# Patient Record
Sex: FEMALE | Race: WHITE | NOT HISPANIC OR LATINO | Employment: OTHER | URBAN - METROPOLITAN AREA
[De-identification: names, ages, dates, MRNs, and addresses within clinical notes are randomized per-mention and may not be internally consistent; named-entity substitution may affect disease eponyms.]

---

## 2017-01-25 ENCOUNTER — TELEPHONE (OUTPATIENT)
Dept: ENDOSCOPY | Facility: HOSPITAL | Age: 67
End: 2017-01-25

## 2017-01-25 NOTE — TELEPHONE ENCOUNTER
Attempted to contact patient in order to schedule ordered Endoscopy procedure.  [id number] call back number provided.

## 2017-01-31 DIAGNOSIS — E11.9 TYPE 2 DIABETES MELLITUS WITHOUT COMPLICATION: ICD-10-CM

## 2017-02-20 PROBLEM — M12.819 ROTATOR CUFF ARTHROPATHY: Status: ACTIVE | Noted: 2017-02-20

## 2017-07-28 PROBLEM — M54.16 LUMBAR RADICULOPATHY: Status: ACTIVE | Noted: 2017-07-28

## 2017-08-08 ENCOUNTER — TELEPHONE (OUTPATIENT)
Dept: PAIN MEDICINE | Facility: CLINIC | Age: 67
End: 2017-08-08

## 2017-08-08 NOTE — TELEPHONE ENCOUNTER
Received a referral from [facility] to consult with Dr. Madsen for L1-L3 facet block and coccyx injection with a Dx: Lumbar Spinal stenosis & Lumbar radiculopathy with right sided sciatica.    Left message for pt to contact the office

## 2017-08-09 NOTE — TELEPHONE ENCOUNTER
Spoke to patient, scheduled appointment with Dr. Madsen at the [place] location on Monday 8/14/17 for 2pm.  Patient agreed to this appointment date and time.

## 2017-08-09 NOTE — TELEPHONE ENCOUNTER
Pt was given a background of what Dr. Madsen offers. Pt agree to see Dr. Madsen to discuss injections. The next available appt in the [facility] is September 8th. Pt stated she would like a sooner appt in the [place] office.    Please contact the pt with the next available appt

## 2017-08-14 ENCOUNTER — OFFICE VISIT (OUTPATIENT)
Dept: PAIN MEDICINE | Facility: CLINIC | Age: 67
End: 2017-08-14
Payer: COMMERCIAL

## 2017-08-14 VITALS
DIASTOLIC BLOOD PRESSURE: 62 MMHG | WEIGHT: 152 LBS | SYSTOLIC BLOOD PRESSURE: 110 MMHG | HEART RATE: 70 BPM | BODY MASS INDEX: 35.33 KG/M2

## 2017-08-14 DIAGNOSIS — G89.4 CHRONIC PAIN SYNDROME: Primary | ICD-10-CM

## 2017-08-14 DIAGNOSIS — M54.16 LUMBAR RADICULOPATHY: ICD-10-CM

## 2017-08-14 DIAGNOSIS — M96.1 LUMBAR POST-LAMINECTOMY SYNDROME: ICD-10-CM

## 2017-08-14 DIAGNOSIS — M53.3 COCCYDYNIA: ICD-10-CM

## 2017-08-14 PROCEDURE — 1125F AMNT PAIN NOTED PAIN PRSNT: CPT | Mod: S$GLB,,, | Performed by: ANESTHESIOLOGY

## 2017-08-14 PROCEDURE — 99204 OFFICE O/P NEW MOD 45 MIN: CPT | Mod: S$GLB,,, | Performed by: ANESTHESIOLOGY

## 2017-08-14 PROCEDURE — 99999 PR PBB SHADOW E&M-EST. PATIENT-LVL IV: CPT | Mod: PBBFAC,,, | Performed by: ANESTHESIOLOGY

## 2017-08-14 PROCEDURE — 3008F BODY MASS INDEX DOCD: CPT | Mod: S$GLB,,, | Performed by: ANESTHESIOLOGY

## 2017-08-14 PROCEDURE — 1159F MED LIST DOCD IN RCRD: CPT | Mod: S$GLB,,, | Performed by: ANESTHESIOLOGY

## 2017-08-14 NOTE — PROGRESS NOTES
Chronic Pain - New Consult    Referring Physician: Cesar Vanessa MD    Chief Complaint:   Chief Complaint   Patient presents with    Low-back Pain     referred by DR. Cesar Vanessa    Leg Pain     right        SUBJECTIVE:    Margarita Ventura presents to the clinic for the evaluation of back pain. The pain started 2 weeks ago following CT mylegram dye and symptoms have been worsening.The pain is located in the coccyx area and radiates to the right lower extremity to toes.  The pain is described as aching and is rated as 10/10. The pain is rated with a score of  9/10 on the BEST day and a score of 10/10 on the WORST day.  Symptoms interfere with daily activity, sleeping and work. The pain is exacerbated by Sitting, Standing, Touching, Coughing/Sneezing, Walking and Getting out of bed/chair.  The pain is mitigated by medications. She reports spending 0 hours per day reclining. The patient reports 7-8 hours of uninterrupted sleep per night.    She has hx of 2 L spine surgies, L3-5 diskectomy and fusion and L5-S1 fusion many years ago    Patient denies night fever/night sweats, urinary incontinence, bowel incontinence, significant weight loss, significant motor weakness and loss of sensations.    Physical Therapy/Home Exercise: no      Pain Disability Index Review:  Last 3 PDI Scores 8/14/2017   Pain Disability Index (PDI) 66       Pain Medications:    - Opioids: Percocet  - Adjuvant Medications: Lyrica ( Pregabalin), Tizanidine  - Anti-Coagulants: Aspirin  - Others: see medication list     report:  Reviewed and consistent with medication use as prescribed.    Pain Procedures: two former back surgeries 1997, 2000    Imaging: CT Lumbar Spine Without Contrast  Narrative     Study: Lumbar CT myelogram    Indication: Lumbar radiculopathy    Technique: Axial CT images were obtained following lumbar myelogram.  Coronal and sagittal reformatted images obtained. Iterative reconstruction technique was used.       Comparison:  Lumbar CT myelogram of 06/13/2014.    Findings:   At L1-2, a broad-based dorsal disc bulge with minimal effacement of thecal sac.  No spinal stenosis or foraminal narrowing.  This is a new finding from the prior exam.    At L2-3, broad-based dorsal disc bulge with a superimposed left central disc extrusion which extends cranially for a short distance along the posterior margin of L2 vertebral body.  This results in mild compression of thecal sac with no spinal stenosis or foraminal narrowing.  The disc extrusion is a new finding compared to the prior study.    Bilateral pedicle screws at L3, L4, L5 and S1 with posterior interconnecting rods, as noted on prior examination.  There is associated streak artifact related to the hardware.  There is chronic lucency surrounding the left S1 pedicle screw.    At L3-4, there is discectomy with cage device in place.  Decompressive laminectomy.  There is no spinal stenosis or foraminal narrowing.    At L4-5, there is discectomy with cage device in place.  Decompressive laminectomy. There is no spinal stenosis or foraminal narrowing.    At L5-S1, there is a decompressive laminectomy.  No spinal stenosis or foraminal narrowing.    Spinal stimulator device is noted.   Impression       1.  At L2-3, a broad-based dorsal disc bulge with a superimposed left central disc extrusion which extends cranially for short distance along the posterior margin of L2 and results in mild compression of thecal sac with no spinal stenosis or foraminal narrowing.  The disc extrusion is a new finding when compared to the prior lumbar CT myelogram of 06/13/2014.  2.  At L1-2, a broad-based dorsal disc bulge with minimal thecal sac effacement.  This is a new finding from prior exam.  3. Discectomy with posterior fusion L3-L5 with decompressive laminectomy and posterior fusion L5-S1.      Electronically signed by: DEJON NEWSOME MD  Date: 07/28/17  Time: 15:19            Past Medical History:   Diagnosis  Date    Acute renal failure (ARF)     PAST H/O    Anxiety and depression     Arthritis     RA    Bronchitis     COPD (chronic obstructive pulmonary disease)     Diabetes mellitus     IDDM    Diverticulosis     Diverticulosis of colon 2006    Encounter for blood transfusion     Fibromyalgia     History of stroke -- cerebellar CVA 05/2014    Believed secondary to temporal arteritis    Hypertension     Klippel-Feil syndrome     Left anterior fascicular block     Peroneal tenosynovitis 10/2015    Pneumonia     Seizures     Shingles 2005    Stroke     Suicide attempt 08/15/2016    Use of BDZ, TCA, barbiturates    Tear of talofibular ligament of right lower extremity 10/2015    Temporal arteritis     Temporal arteritis syndrome     Thyroid disease      Past Surgical History:   Procedure Laterality Date    CARPAL TUNNEL RELEASE Bilateral     CHOLECYSTECTOMY  1985    COLONOSCOPY  2013    ESOPHAGOGASTRODUODENOSCOPY  09/17/2015    Biopsies performed    HYSTERECTOMY  1979    LUMBAR SPINE SURGERY      Diskectomy at L3-4 and L4-5 with laminectomies    TONSILLECTOMY       Social History     Social History    Marital status: Single     Spouse name: N/A    Number of children: N/A    Years of education: N/A     Occupational History    Not on file.     Social History Main Topics    Smoking status: Current Every Day Smoker     Packs/day: 0.25     Years: 40.00     Types: Cigarettes    Smokeless tobacco: Not on file    Alcohol use No    Drug use: No    Sexual activity: Not on file     Other Topics Concern    Not on file     Social History Narrative    No narrative on file     Family History   Problem Relation Age of Onset    Diabetes Mother     Heart disease Mother     Heart disease Father        Review of patient's allergies indicates:  No Known Allergies    Current Outpatient Prescriptions   Medication Sig    acetaminophen (TYLENOL) 500 MG tablet Take 500 mg by mouth every 6 (six) hours as  "needed for Pain.    albuterol (ACCUNEB) 1.25 mg/3 mL Nebu Take 1.25 mg by nebulization 2 (two) times daily.     albuterol (PROVENTIL) 2.5 mg /3 mL (0.083 %) nebulizer solution Take 2.5 mg by nebulization every 6 (six) hours as needed for Wheezing. Rescue    albuterol (VENTOLIN HFA) 90 mcg/actuation inhaler Inhale 2 puffs into the lungs 2 (two) times daily. Rescue    alprazolam (XANAX) 1 MG tablet TK 1 T PO TID    amitriptyline (ELAVIL) 75 MG tablet Take 75 mg by mouth every evening.    aspirin 81 MG Chew Take 81 mg by mouth once daily.    BD INSULIN PEN NEEDLE UF SHORT 31 gauge x 5/16" Ndle USE AS DIRECTED QID    escitalopram oxalate (LEXAPRO) 20 MG tablet TK 1 T PO AM    fluticasone (FLOVENT HFA) 44 mcg/actuation inhaler Inhale 2 puffs into the lungs 2 (two) times daily.     furosemide (LASIX) 40 MG tablet Take 40 mg by mouth every morning.     HUMALOG KWIKPEN 100 unit/mL InPn pen INJECT SC 5 UNITS BEFORE EACH MEAL PENDING MEAL SIZE AS DIRECTED    insulin syringe-needle U-100 1 mL 30 gauge X 7/16" Syrg USE TO TEST QD    LEVEMIR 100 unit/mL injection INJECT SC 50 UNITS QAM THEN 50 UNITS QPM    levetiracetam (KEPPRA) 500 MG Tab TK 1 T PO  BID    levothyroxine (SYNTHROID) 75 MCG tablet TK 1 T PO AM    LYRICA 100 mg capsule TK 1 C PO TID    mirtazapine (REMERON) 30 MG tablet Take 30 mg by mouth every evening.    mycophenolate (CELLCEPT) 250 mg Cap Q AM    NITROSTAT 0.4 mg SL tablet ALICIA    oxycodone-acetaminophen (PERCOCET)  mg per tablet TK 1 T PO TID, PRN PAIN    pregabalin (LYRICA) 75 MG capsule Take 100 mg by mouth 3 (three) times daily.     PROAIR HFA 90 mcg/actuation inhaler INHALE 2 PUFFS PO BID    rosuvastatin (CRESTOR) 20 MG tablet Take 1 tablet (20 mg total) by mouth once daily. (Patient taking differently: Take 20 mg by mouth nightly. )    SYRINGE AND NEEDLE,INSULIN,1ML (INSULIN SYRINGE-NEEDLE U-100) 1 mL 29 gauge x 7/16" Syrg USE AS DIRECTED BID    tizanidine (ZANAFLEX) 4 MG " tablet Take 4 mg by mouth nightly.    TRADJENTA 5 mg Tab tablet TK 1 T PO AM     No current facility-administered medications for this visit.        REVIEW OF SYSTEMS:    GENERAL:  No weight loss, malaise or fevers.+ DM   HEENT:  Negative for frequent or significant headaches.   NECK:+ neck pain   RESPIRATORY: + COPD,  Negative for cough, wheezing or shortness of breath.  CARDIOVASCULAR:  Negative for chest pain, leg swelling or palpitations.  GI:  Negative for abdominal discomfort, blood in stools or black stools or change in bowel habits.  MUSCULOSKELETAL:  See HPI.  SKIN:  Negative for lesions, rash, and itching.  PSYCH:  Negative for sleep disturbance, + depression + anxiety   HEMATOLOGY/LYMPHOLOGY:  Negative for prolonged bleeding, bruising easily or swollen nodes.  NEURO: + temporal arteritis  No history of  syncope, paralysis, seizures  Hx of CVA, + seizures on Keppra   All other reviewed and negative other than HPI.    OBJECTIVE:    /62   Pulse 70   Wt 68.9 kg (152 lb)   BMI 35.33 kg/m²     PHYSICAL EXAMINATION:    General appearance: Well appearing, in no acute distress, alert and oriented x3.  Psych:  Mood and affect appropriate.  Skin:scar of previous L spine surgery, + rash in the buttock/coccygela area (burn due to heating pad)  Skin color, texture, turgor normal, no rashes or lesions, in both upper and lower body.  Head/face:  Normocephalic, atraumatic. No palpable lymph nodes.  Neck: No pain to palpation over the cervical paraspinous muscles. Spurling Negative. No pain with neck flexion, extension, or lateral flexion.   Cor: RRR  Pulm: CTA  Back:+ rash in the buttock and coccygeal area ( pt slept over heating pad and has had that rash for, burn over the area, dressing on top)  Straight leg raising in the sitting and supine positions is negative to radicular pain. +  pain to palpation over the L spine   Extremities: Peripheral joint ROM is full and pain free without obvious instability or  laxity in all four extremities. No deformities, edema, or skin discoloration. Good capillary refill.  Musculoskeletal: Shoulder, hip, sacroiliac and knee provocative maneuvers are negative. Bilateral upper and lower extremity strength is normal and symmetric.  No atrophy or tone abnormalities are noted.  Neuro: Bilateral upper and lower extremity coordination and muscle stretch reflexes are physiologic and symmetric.  Plantar response are downgoing. No loss of sensation is noted.  Gait: slow    ASSESSMENT: 66 y.o. year old female with coccygeal pain and right leg radicular   pain, consistent with        1. Chronic pain syndrome    2. Lumbar post-laminectomy syndrome    3. Lumbar radiculopathy    4. Coccydynia      She has hx of 2 L spine surgies, L3-5 diskectomy and fusion and L5-S1 fusion many years ago  She is now on percocet 4 times day. He has a burn area around the coccygeal /buttocks after using a heating pad.   PLAN:     - I have stressed the importance of physical activity and a home exercise plan to help with pain and improve health.  -I will consider caudal OSWALDO with catheter , however I will await till the rash/bruned area is healed. She will come back in 2 weeks and she can continue topical treatment of the area. She also need to have better glycemic control prior to injection  -She might be an SCS candidate, however proper glycemic control should be achieved first.   - RTC in 2 weeks  - Counseled patient regarding the importance of activity modification, constant sleeping habits and physical therapy.    The above plan and management options were discussed at length with patient. Patient is in agreement with the above and verbalized understanding. It will be communicated with the referring physician via electronic record, fax, or mail.    Cecilia Madsen  08/14/2017

## 2017-08-14 NOTE — LETTER
August 14, 2017      Cesar Vanessa MD  [facility]  [facility] [address]           [facility]  [address] [place] [address] [address]  [facility] [address]  Phone: [phone]          Patient: Margarita Ventura   MR Number: [id number]   YOB: 1950   Date of Visit: 8/14/2017       Dear Dr. Cesar Vanessa:    Thank you for referring Margarita Ventura to me for evaluation. Attached you will find relevant portions of my assessment and plan of care.    If you have questions, please do not hesitate to call me. I look forward to following Margarita Ventura along with you.    Sincerely,    Cecilia Madsen MD    Enclosure  CC:  No Recipients    If you would like to receive this communication electronically, please contact [email or web address] or [phone] to request more information on [facility] access.    For providers and/or their staff who would like to refer a patient to [facility], please contact us through our one-stop-shop provider referral line, [facility] , at [phone].    If you feel you have received this communication in error or would no longer like to receive these types of communications, please e-mail [email or web address]

## 2017-09-06 NOTE — PROGRESS NOTES
Chronic patient Established Note (Follow up visit)      SUBJECTIVE:    Margarita Ventura presents to the clinic for a follow-up appointment for back pain. She has hx of 2 L spine surgies, L3-5 diskectomy and fusion and L5-S1 fusion many years ago. She was rescheduled for a Caudal OSWALDO with Dr. Madsen due to rash/bruned area not being healed, she has since seen Dr. Orellana her PC for clearance and has been cleared. I will have my MA scan prescription clearance  into media per Dr. Orellana and s/f a Caudal OSWALDO per Dr. Madsen. I will s/f Caudal OSWALDO  with Cath       Patient denies night fever/night sweats, urinary incontinence, bowel incontinence, significant weight loss, significant motor weakness and loss of sensations.  Since the last visit, Margarita Ventura states the pain has been persistant. Current pain intensity is 6/10.    Pain Disability Index Review:  Last 3 PDI Scores 9/8/2017 8/14/2017   Pain Disability Index (PDI) 47 66       Pain Medications:     - Opioids: Percocet  - Adjuvant Medications: Lyrica ( Pregabalin), Tizanidine  - Anti-Coagulants: Aspirin  - Others: see medication list    Opioid Contract: no     report:  Reviewed and consistent with medication use as prescribed.    Pain Procedures:  two former back surgeries 1997, 2000    Physical Therapy/Home Exercise: no    Imaging:  CT Lumbar Spine Without Contrast  Narrative      Study: Lumbar CT myelogram    Indication: Lumbar radiculopathy    Technique: Axial CT images were obtained following lumbar myelogram.  Coronal and sagittal reformatted images obtained. Iterative reconstruction technique was used.       Comparison: Lumbar CT myelogram of 06/13/2014.    Findings:   At L1-2, a broad-based dorsal disc bulge with minimal effacement of thecal sac.  No spinal stenosis or foraminal narrowing.  This is a new finding from the prior exam.    At L2-3, broad-based dorsal disc bulge with a superimposed left central disc extrusion which extends cranially for a short  distance along the posterior margin of L2 vertebral body.  This results in mild compression of thecal sac with no spinal stenosis or foraminal narrowing.  The disc extrusion is a new finding compared to the prior study.    Bilateral pedicle screws at L3, L4, L5 and S1 with posterior interconnecting rods, as noted on prior examination.  There is associated streak artifact related to the hardware.  There is chronic lucency surrounding the left S1 pedicle screw.    At L3-4, there is discectomy with cage device in place.  Decompressive laminectomy.  There is no spinal stenosis or foraminal narrowing.    At L4-5, there is discectomy with cage device in place.  Decompressive laminectomy. There is no spinal stenosis or foraminal narrowing.    At L5-S1, there is a decompressive laminectomy.  No spinal stenosis or foraminal narrowing.    Spinal stimulator device is noted.   Impression        1.  At L2-3, a broad-based dorsal disc bulge with a superimposed left central disc extrusion which extends cranially for short distance along the posterior margin of L2 and results in mild compression of thecal sac with no spinal stenosis or foraminal narrowing.  The disc extrusion is a new finding when compared to the prior lumbar CT myelogram of 06/13/2014.  2.  At L1-2, a broad-based dorsal disc bulge with minimal thecal sac effacement.  This is a new finding from prior exam.  3. Discectomy with posterior fusion L3-L5 with decompressive laminectomy and posterior fusion L5-S1.      Electronically signed by: DEJON NEWSOME MD  Date: 07/28/17  Time: 15:19          Allergies: Review of patient's allergies indicates:  No Known Allergies    Current Medications:   Current Outpatient Prescriptions   Medication Sig Dispense Refill    acetaminophen (TYLENOL) 500 MG tablet Take 500 mg by mouth every 6 (six) hours as needed for Pain.      albuterol (ACCUNEB) 1.25 mg/3 mL Nebu Take 1.25 mg by nebulization 2 (two) times daily.       albuterol  "(PROVENTIL) 2.5 mg /3 mL (0.083 %) nebulizer solution Take 2.5 mg by nebulization every 6 (six) hours as needed for Wheezing. Rescue      albuterol (VENTOLIN HFA) 90 mcg/actuation inhaler Inhale 2 puffs into the lungs 2 (two) times daily. Rescue      alprazolam (XANAX) 1 MG tablet TK 1 T PO TID  2    amitriptyline (ELAVIL) 75 MG tablet Take 75 mg by mouth every evening.      aspirin 81 MG Chew Take 81 mg by mouth once daily.      BD INSULIN PEN NEEDLE UF SHORT 31 gauge x 5/16" Ndle USE AS DIRECTED QID  5    escitalopram oxalate (LEXAPRO) 20 MG tablet TK 1 T PO AM  5    fluticasone (FLOVENT HFA) 44 mcg/actuation inhaler Inhale 2 puffs into the lungs 2 (two) times daily.       furosemide (LASIX) 40 MG tablet Take 40 mg by mouth every morning.       HUMALOG KWIKPEN 100 unit/mL InPn pen INJECT SC 5 UNITS BEFORE EACH MEAL PENDING MEAL SIZE AS DIRECTED  1    insulin syringe-needle U-100 1 mL 30 gauge X 7/16" Syrg USE TO TEST QD  11    LEVEMIR 100 unit/mL injection INJECT SC 50 UNITS QAM THEN 50 UNITS QPM  5    levetiracetam (KEPPRA) 500 MG Tab TK 1 T PO  BID  2    levothyroxine (SYNTHROID) 75 MCG tablet TK 1 T PO AM  6    LYRICA 100 mg capsule TK 1 C PO TID  5    mirtazapine (REMERON) 30 MG tablet Take 30 mg by mouth every evening.      mycophenolate (CELLCEPT) 250 mg Cap Q AM  2    NITROSTAT 0.4 mg SL tablet ALICIA  12    oxycodone-acetaminophen (PERCOCET)  mg per tablet TK 1 T PO TID, PRN PAIN  0    pregabalin (LYRICA) 75 MG capsule Take 100 mg by mouth 3 (three) times daily.       PROAIR HFA 90 mcg/actuation inhaler INHALE 2 PUFFS PO BID  3    rosuvastatin (CRESTOR) 20 MG tablet Take 1 tablet (20 mg total) by mouth once daily. (Patient taking differently: Take 20 mg by mouth nightly. ) 90 tablet 3    SYRINGE AND NEEDLE,INSULIN,1ML (INSULIN SYRINGE-NEEDLE U-100) 1 mL 29 gauge x 7/16" Syrg USE AS DIRECTED BID  5    tizanidine (ZANAFLEX) 4 MG tablet Take 4 mg by mouth nightly.      TRADJENTA 5 " mg Tab tablet TK 1 T PO AM  6     No current facility-administered medications for this visit.        REVIEW OF SYSTEMS:    GENERAL:  No weight loss, malaise or fevers.+ DM   HEENT:  Negative for frequent or significant headaches.   NECK:+ neck pain   RESPIRATORY: + COPD,  Negative for cough, wheezing or shortness of breath.  CARDIOVASCULAR:  Negative for chest pain, leg swelling or palpitations.  GI:  Negative for abdominal discomfort, blood in stools or black stools or change in bowel habits.  MUSCULOSKELETAL:  See HPI.  SKIN:  Negative for lesions, rash, and itching.  PSYCH:  Negative for sleep disturbance, + depression + anxiety   HEMATOLOGY/LYMPHOLOGY:  Negative for prolonged bleeding, bruising easily or swollen nodes.  NEURO: + temporal arteritis  No history of  syncope, paralysis, seizures  Hx of CVA, + seizures on Keppra   All other reviewed and negative other than HPI.       Past Medical History:  Past Medical History:   Diagnosis Date    Acute renal failure (ARF)     PAST H/O    Anxiety and depression     Arthritis     RA    Bronchitis     COPD (chronic obstructive pulmonary disease)     Diabetes mellitus     IDDM    Diverticulosis     Diverticulosis of colon 2006    Encounter for blood transfusion     Fibromyalgia     History of stroke -- cerebellar CVA 05/2014    Believed secondary to temporal arteritis    Hypertension     Klippel-Feil syndrome     Left anterior fascicular block     Peroneal tenosynovitis 10/2015    Pneumonia     Seizures     Shingles 2005    Stroke     Suicide attempt 08/15/2016    Use of BDZ, TCA, barbiturates    Tear of talofibular ligament of right lower extremity 10/2015    Temporal arteritis     Temporal arteritis syndrome     Thyroid disease        Past Surgical History:  Past Surgical History:   Procedure Laterality Date    CARPAL TUNNEL RELEASE Bilateral     CHOLECYSTECTOMY  1985    COLONOSCOPY  2013    ESOPHAGOGASTRODUODENOSCOPY  09/17/2015     Biopsies performed    HYSTERECTOMY  1979    LUMBAR SPINE SURGERY      Diskectomy at L3-4 and L4-5 with laminectomies    TONSILLECTOMY         Family History:  Family History   Problem Relation Age of Onset    Diabetes Mother     Heart disease Mother     Heart disease Father        Social History:  Social History     Social History    Marital status: Single     Spouse name: N/A    Number of children: N/A    Years of education: N/A     Social History Main Topics    Smoking status: Current Every Day Smoker     Packs/day: 0.25     Years: 40.00     Types: Cigarettes    Smokeless tobacco: None    Alcohol use No    Drug use: No    Sexual activity: Not Asked     Other Topics Concern    None     Social History Narrative    None       OBJECTIVE:    /73   Pulse 81   Wt 69.1 kg (152 lb 5.4 oz)   BMI 35.41 kg/m²     PHYSICAL EXAMINATION:    General appearance: Well appearing, in no acute distress, alert and oriented x3.  Psych:  Mood and affect appropriate.  Skin:scar of previous L spine surgery, + rash in the buttock/coccygela area (burn due to heating pad)  Skin color, texture, turgor normal, no rashes or lesions, in both upper and lower body.  Head/face:  Normocephalic, atraumatic. No palpable lymph nodes.  Neck: No pain to palpation over the cervical paraspinous muscles. Spurling Negative. No pain with neck flexion, extension, or lateral flexion.   Cor: RRR  Pulm: CTA  Back:+ rash in the buttock and coccygeal area ( pt slept over heating pad and has had that rash for, burn over the area, dressing on top)  Straight leg raising in the sitting and supine positions is negative to radicular pain. +  pain to palpation over the L spine   Extremities: Peripheral joint ROM is full and pain free without obvious instability or laxity in all four extremities. No deformities, edema, or skin discoloration. Good capillary refill.  Musculoskeletal: Shoulder, hip, sacroiliac and knee provocative maneuvers are  negative. Bilateral upper and lower extremity strength is normal and symmetric.  No atrophy or tone abnormalities are noted.  Neuro: Bilateral upper and lower extremity coordination and muscle stretch reflexes are physiologic and symmetric.  Plantar response are downgoing. No loss of sensation is noted.  Gait: slow      ASSESSMENT: 66 y.o. year old female with with coccygeal pain and right leg radicular   pain, consistent with         Diagnosis:    1. Chronic pain syndrome     2. Lumbar post-laminectomy syndrome     3. Lumbar radiculopathy     4. Coccydynia          She has hx of 2 L spine surgies, L3-5 diskectomy and fusion and L5-S1 fusion many years ago  She is now on percocet 4 times day. He has a burn area around the coccygeal /buttocks after using a heating pad.     PLAN:     - I have stressed the importance of physical activity and a home exercise plan to help with pain and improve health.  - Patient can continue with medications for now since they are providing benefits, using them appropriately, and without side effects.   - Counseled patient regarding the importance of activity modification, constant sleeping habits and physical therapy.  -She has been cleared by her PCP to have injection, she had previous rash/burn that wasn't healed.  Pr  -I personally examined burn/ rash and it appears to be healed, she will have injection next Friday  - Schedule for a caudal OSWALDO with catheter  to help withpain and progress with a Home exercise program.  - I have explained the risks, benefits, and alternatives of the procedure in detail. The patient voices understanding and all questions have been answered. The patient agrees to proceed as planned. Written Consent obtained.   -RTC 2-3 weeks following procedure.  -The above plan and management options were discussed at length with patient. Patient is in agreement with the above and verbalized understanding. Dr. Madsen   was consulted on this patient  and agrees with this  plan.       MIRELLA Potter    09/08/2017

## 2017-09-08 ENCOUNTER — OFFICE VISIT (OUTPATIENT)
Dept: PAIN MEDICINE | Facility: CLINIC | Age: 67
End: 2017-09-08
Payer: COMMERCIAL

## 2017-09-08 VITALS
WEIGHT: 152.31 LBS | HEART RATE: 81 BPM | SYSTOLIC BLOOD PRESSURE: 129 MMHG | DIASTOLIC BLOOD PRESSURE: 73 MMHG | BODY MASS INDEX: 35.41 KG/M2

## 2017-09-08 DIAGNOSIS — M53.3 COCCYDYNIA: ICD-10-CM

## 2017-09-08 DIAGNOSIS — M54.16 LUMBAR RADICULOPATHY: ICD-10-CM

## 2017-09-08 DIAGNOSIS — M96.1 LUMBAR POST-LAMINECTOMY SYNDROME: ICD-10-CM

## 2017-09-08 DIAGNOSIS — G89.4 CHRONIC PAIN SYNDROME: Primary | ICD-10-CM

## 2017-09-08 PROCEDURE — 99213 OFFICE O/P EST LOW 20 MIN: CPT | Mod: S$GLB,,, | Performed by: NURSE PRACTITIONER

## 2017-09-08 PROCEDURE — 3008F BODY MASS INDEX DOCD: CPT | Mod: S$GLB,,, | Performed by: NURSE PRACTITIONER

## 2017-09-08 PROCEDURE — 99999 PR PBB SHADOW E&M-EST. PATIENT-LVL III: CPT | Mod: PBBFAC,,, | Performed by: NURSE PRACTITIONER

## 2017-09-08 PROCEDURE — 1125F AMNT PAIN NOTED PAIN PRSNT: CPT | Mod: S$GLB,,, | Performed by: NURSE PRACTITIONER

## 2017-09-08 PROCEDURE — 1159F MED LIST DOCD IN RCRD: CPT | Mod: S$GLB,,, | Performed by: NURSE PRACTITIONER

## 2017-09-13 RX ORDER — ASPIRIN 81 MG/1
81 TABLET ORAL DAILY
COMMUNITY
End: 2020-08-03

## 2017-09-15 ENCOUNTER — HOSPITAL ENCOUNTER (OUTPATIENT)
Facility: HOSPITAL | Age: 67
Discharge: HOME | End: 2017-09-15
Attending: ANESTHESIOLOGY | Admitting: ANESTHESIOLOGY
Payer: COMMERCIAL

## 2017-09-15 ENCOUNTER — HOSPITAL ENCOUNTER (OUTPATIENT)
Dept: RADIOLOGY | Facility: HOSPITAL | Age: 67
Discharge: HOME | End: 2017-09-15
Attending: NURSE PRACTITIONER
Payer: COMMERCIAL

## 2017-09-15 DIAGNOSIS — M54.16 LUMBAR RADICULOPATHY: ICD-10-CM

## 2017-09-15 PROCEDURE — 27200120 HC KIT IV START (RUSH ONLY): Performed by: ANESTHESIOLOGY

## 2017-09-15 PROCEDURE — 62327 NJX INTERLAMINAR LMBR/SAC: CPT | Mod: ,,, | Performed by: ANESTHESIOLOGY

## 2017-09-15 PROCEDURE — 25500020 PHARM REV CODE 255: Performed by: ANESTHESIOLOGY

## 2017-09-15 PROCEDURE — 25000003 PHARM REV CODE 250: Performed by: ANESTHESIOLOGY

## 2017-09-15 PROCEDURE — 63600175 PHARM REV CODE 636 W HCPCS: Performed by: ANESTHESIOLOGY

## 2017-09-15 PROCEDURE — 62323 NJX INTERLAMINAR LMBR/SAC: CPT | Performed by: ANESTHESIOLOGY

## 2017-09-15 PROCEDURE — 99152 MOD SED SAME PHYS/QHP 5/>YRS: CPT | Mod: ,,, | Performed by: ANESTHESIOLOGY

## 2017-09-15 RX ORDER — SODIUM CHLORIDE, SODIUM LACTATE, POTASSIUM CHLORIDE, CALCIUM CHLORIDE 600; 310; 30; 20 MG/100ML; MG/100ML; MG/100ML; MG/100ML
INJECTION, SOLUTION INTRAVENOUS CONTINUOUS
Status: DISCONTINUED | OUTPATIENT
Start: 2017-09-15 | End: 2017-09-15 | Stop reason: HOSPADM

## 2017-09-15 RX ORDER — MIDAZOLAM HYDROCHLORIDE 1 MG/ML
INJECTION INTRAMUSCULAR; INTRAVENOUS
Status: DISCONTINUED | OUTPATIENT
Start: 2017-09-15 | End: 2017-09-15 | Stop reason: HOSPADM

## 2017-09-15 RX ORDER — TRIAMCINOLONE ACETONIDE 40 MG/ML
INJECTION, SUSPENSION INTRA-ARTICULAR; INTRAMUSCULAR
Status: DISCONTINUED | OUTPATIENT
Start: 2017-09-15 | End: 2017-09-15 | Stop reason: HOSPADM

## 2017-09-15 RX ORDER — FENTANYL CITRATE 50 UG/ML
INJECTION, SOLUTION INTRAMUSCULAR; INTRAVENOUS
Status: DISCONTINUED | OUTPATIENT
Start: 2017-09-15 | End: 2017-09-15 | Stop reason: HOSPADM

## 2017-09-15 RX ORDER — LIDOCAINE HYDROCHLORIDE 5 MG/ML
INJECTION, SOLUTION INFILTRATION; PERINEURAL
Status: DISCONTINUED | OUTPATIENT
Start: 2017-09-15 | End: 2017-09-15 | Stop reason: HOSPADM

## 2017-09-15 RX ORDER — LIDOCAINE HYDROCHLORIDE 10 MG/ML
INJECTION, SOLUTION EPIDURAL; INFILTRATION; INTRACAUDAL; PERINEURAL
Status: DISCONTINUED | OUTPATIENT
Start: 2017-09-15 | End: 2017-09-15 | Stop reason: HOSPADM

## 2017-09-15 RX ORDER — HYDROCODONE BITARTRATE AND ACETAMINOPHEN 5; 325 MG/1; MG/1
2 TABLET ORAL EVERY 6 HOURS PRN
Status: DISCONTINUED | OUTPATIENT
Start: 2017-09-15 | End: 2017-09-15 | Stop reason: HOSPADM

## 2017-09-15 RX ADMIN — SODIUM CHLORIDE, SODIUM LACTATE, POTASSIUM CHLORIDE, AND CALCIUM CHLORIDE: .6; .31; .03; .02 INJECTION, SOLUTION INTRAVENOUS at 08:09

## 2017-09-15 NOTE — INTERVAL H&P NOTE
The patient has been examined and the H&P has been reviewed:        I concur with the findings and no changes have occurred since H&P was written.        Patient cleared for Anesthesia: Conscious Sedation        Anesthesia/Surgery risks, benefits and alternative options discussed and understood by patient/family.      Active Hospital Problems    Diagnosis  POA    Lumbar radiculopathy [M54.16]  Yes      Resolved Hospital Problems    Diagnosis Date Resolved POA   No resolved problems to display.

## 2017-09-15 NOTE — OP NOTE
Patient Name: Margarita Ventura  MRN: [id number]    INFORMED CONSENT: The procedure, risks, benefits and options were discussed with patient. There are no contraindications to the procedure. The patient expressed understanding and agreed to proceed. The personnel performing the procedure was discussed. I verify that I personally obtained Margarita's consent prior to the start of the procedure and the signed consent can be found on the patient's chart.    PROCEDURE: CAUDAL OSWALDO with Racz Catheter    Procedure Date: 9/15/2017  Anesthesia:   systemic  Pre Procedure diagnosis: lumbar radiculopathy  Post-Procedure diagnosis: Same    Sedation: Yes - Fentanyl 50 mcg and Midazolam 1 mg    DESCRIPTION OF PROCEDURE: After fully informed written consent was obtained, the patient was brought to the procedure room and placed in the prone position. Monitoring of pulse oximetry, heart rate, and blood pressure was done pre-procedure, during the procedure, and post-procedure. The skin was prepped with chlorhexidine three times and draped in a sterile fashion. With a 25-gauge 1.5  inch needle, 5 cc of lidocaine 1% was injected subcutaneously over the entry site. The sacrum and sacral cornua were visualized in an AP view.  An 16 gauge 31/2 inch Tuohy needle was inserted and advanced into the sacral hiatus under fluoroscopic guidance. After the needle passed through the sacrococcygeal ligament, the needle angle was lowered and the needle was advanced . The needle position was confirmed using AP and lateral fluoroscopic imaging. There was no evidence of paresthesias throughout needle placement. A radiopaque 20 G Flextip catheter  was introduced through the needle and directed to the L5 level, under fluoroscopic guidance. The stylette was removed and aspiration was negative for blood or CSF. 5 ml of Omnipaque 300 contrast agent was slowly injected. Confirmation of spread of contrast agent within the epidural space was made with fluoroscopic  imaging in the AP and lateral views. Subsequently, 12 mL of lidocaine 0.5% and 40 mg Kenalog was slowly administered without resistance. There was no pain on injection. Contrast spread was noted from S2 to L5. The needle and catheter  were removed and bleeding was nil . The patient tolerated the procedure well and there was no evidence of procedural complications. A sterile dressing was applied. No  specimens collected. Margarita was taken back to the recovery room for further observation.     Blood Loss: Nill  Specimen: None    Pre Procedure Pain Level: 10/10  Post-Procedure Pain Level: 5/10      Discharge Diagnosis: Same as Pre and Post Procedure  Condition on Discharge: Stable.  Diet on Discharge: Same as before.  Activity: as per instruction sheet.  Discharge to: Home with a responsible adult.  Follow up: as per Discharge instructions

## 2017-09-15 NOTE — PROGRESS NOTES
Patient pulse of 79 on room air upon admit today. Dr Madsen notified patient placed on 2LNC. Patient 93 on 2LNC. Patient states she has o2 at home and wears it only at night usually runs in the low 80s at home druing the day. Patient deneis SOB or chest pain at this time. Patient states that on wed. 9/13/17 she had chest pain at home which was releived with 4 nitro pills. Dr Madsen notified of these findings and will proceed with the procedure today.

## 2017-09-22 LAB — POCT GLUCOSE: 94 MG/DL (ref 70–110)

## 2017-09-26 VITALS
HEART RATE: 69 BPM | WEIGHT: 149 LBS | OXYGEN SATURATION: 93 % | RESPIRATION RATE: 18 BRPM | HEIGHT: 55 IN | TEMPERATURE: 97 F | SYSTOLIC BLOOD PRESSURE: 106 MMHG | BODY MASS INDEX: 34.48 KG/M2 | DIASTOLIC BLOOD PRESSURE: 59 MMHG

## 2017-10-15 DIAGNOSIS — E11.69 HYPERLIPIDEMIA ASSOCIATED WITH TYPE 2 DIABETES MELLITUS: ICD-10-CM

## 2017-10-15 DIAGNOSIS — E78.5 HYPERLIPIDEMIA ASSOCIATED WITH TYPE 2 DIABETES MELLITUS: ICD-10-CM

## 2017-10-16 RX ORDER — ROSUVASTATIN CALCIUM 20 MG/1
TABLET, COATED ORAL
Qty: 90 TABLET | Refills: 3 | Status: SHIPPED | OUTPATIENT
Start: 2017-10-16 | End: 2022-11-16

## 2018-07-19 NOTE — PROGRESS NOTES
Chronic patient Established Note (Follow up visit)      SUBJECTIVE:    Margarita Ventura presents to the clinic for a follow-up appointment for low back and bilateral hip pain.  She was last seen in our office 6 months ago, she has hx of 2 L spine surgies, L3-5 diskectomy and fusion and L5-S1 fusion many years ago. She was previously s/f Caudal OSWALDO with Cath but couldn't due to a rash, she has since been cleared she recently saw Dr. Hurst/neurosurgery. I will s/f a Caudal OSWALDO with cath to help with lower back and madelyn leg pain.     Since the last visit, Margarita Ventura states the pain has been worsening. Current pain intensity is 7/10.    Pain Disability Index Review:  Last 3 PDI Scores 2018   Pain Disability Index (PDI) 42 [phone]       Pain Medications:     - Opioids: Percocet  - Adjuvant Medications: Lyrica ( Pregabalin), Tizanidine  - Anti-Coagulants: Aspirin  - Others: see medication list     Opioid Contract: no      report:  Reviewed and consistent with medication use as prescribed.     Pain Procedures:  two former back surgeries ,      Physical Therapy/Home Exercise: no     Imagin18 MRI Lumbar Spine W WO Contrast    Narrative     EXAMINATION:  MRI LUMBAR SPINE W WO CONTRAST    CLINICAL HISTORY:  Low back pain, bilateral lower extremity radiculopathy; Arthrodesis status    TECHNIQUE:  MRI of the lumbar spine was performed pre and post IV contrast administration using multiple planes and sequences.    COMPARISON:  Lumbar CT myelogram 2017.    FINDINGS:  At L1-2, there is an annular disc bulge resulting in mild compression of thecal sac with no spinal stenosis or foraminal narrowing.  This appears stable.    At L2-3, there is a broad-based dorsal disc bulge with a superimposed left central disc extrusion which extends cranially for short distance along the posterior margin of the L2 vertebral body and results in mild compression of thecal sac with no spinal  stenosis or foraminal narrowing.  This appears stable.    There are bilateral pedicle screws at L3 through S1 with posterior connecting rods and associated hardware artifact.  There are intervertebral disc spacers at L3-4 and L4-5.    At L3-4, there is decompressive laminectomy with no spinal stenosis or foraminal narrowing.    At L4-5, decompressive laminectomy with no spinal stenosis or foraminal narrowing.    At L5-S1, there is decompressive laminectomy.  No disc protrusion, spinal stenosis or foraminal narrowing.    The conus is in normal position and demonstrates no abnormality.  Postcontrast images demonstrate no masses or abnormal enhancement.           CT Lumbar Spine Without Contrast  Narrative      Study: Lumbar CT myelogram    Indication: Lumbar radiculopathy    Technique: Axial CT images were obtained following lumbar myelogram.  Coronal and sagittal reformatted images obtained. Iterative reconstruction technique was used.       Comparison: Lumbar CT myelogram of 06/13/2014.    Findings:   At L1-2, a broad-based dorsal disc bulge with minimal effacement of thecal sac.  No spinal stenosis or foraminal narrowing.  This is a new finding from the prior exam.    At L2-3, broad-based dorsal disc bulge with a superimposed left central disc extrusion which extends cranially for a short distance along the posterior margin of L2 vertebral body.  This results in mild compression of thecal sac with no spinal stenosis or foraminal narrowing.  The disc extrusion is a new finding compared to the prior study.    Bilateral pedicle screws at L3, L4, L5 and S1 with posterior interconnecting rods, as noted on prior examination.  There is associated streak artifact related to the hardware.  There is chronic lucency surrounding the left S1 pedicle screw.    At L3-4, there is discectomy with cage device in place.  Decompressive laminectomy.  There is no spinal stenosis or foraminal narrowing.    At L4-5, there is discectomy  "with cage device in place.  Decompressive laminectomy. There is no spinal stenosis or foraminal narrowing.    At L5-S1, there is a decompressive laminectomy.  No spinal stenosis or foraminal narrowing.    Spinal stimulator device is noted.   Impression        1.  At L2-3, a broad-based dorsal disc bulge with a superimposed left central disc extrusion which extends cranially for short distance along the posterior margin of L2 and results in mild compression of thecal sac with no spinal stenosis or foraminal narrowing.  The disc extrusion is a new finding when compared to the prior lumbar CT myelogram of 06/13/2014.  2.  At L1-2, a broad-based dorsal disc bulge with minimal thecal sac effacement.  This is a new finding from prior exam.  3. Discectomy with posterior fusion L3-L5 with decompressive laminectomy and posterior fusion L5-S1.      Electronically signed by: DEJON NEWSOME MD  Date: 07/28/17  Time: 15:19        Allergies: Review of patient's allergies indicates:  No Known Allergies    Current Medications:   Current Outpatient Prescriptions   Medication Sig Dispense Refill    albuterol (ACCUNEB) 1.25 mg/3 mL Nebu Take 1.25 mg by nebulization 2 (two) times daily as needed.       albuterol (VENTOLIN HFA) 90 mcg/actuation inhaler Inhale 2 puffs into the lungs 2 (two) times daily. Rescue      alprazolam (XANAX) 1 MG tablet TK 1 T PO TID  2    amitriptyline (ELAVIL) 75 MG tablet Take 75 mg by mouth every evening.      aspirin (ASPIRIN LOW DOSE) 81 MG EC tablet Take 81 mg by mouth once daily.      BD INSULIN PEN NEEDLE UF SHORT 31 gauge x 5/16" Ndle USE AS DIRECTED QID  5    escitalopram oxalate (LEXAPRO) 20 MG tablet TK 1 T PO AM  5    fluticasone (FLOVENT HFA) 44 mcg/actuation inhaler Inhale 2 puffs into the lungs 2 (two) times daily.       furosemide (LASIX) 40 MG tablet Take 40 mg by mouth every morning.       HUMALOG KWIKPEN 100 unit/mL InPn pen INJECT SC 5 UNITS BEFORE EACH MEAL PENDING MEAL SIZE AS " "DIRECTED  1    insulin syringe-needle U-100 1 mL 30 gauge X 7/16" Syrg USE TO TEST QD  11    LEVEMIR 100 unit/mL injection INJECT SC 50 UNITS QAM THEN 50 UNITS QPM  5    levetiracetam (KEPPRA) 500 MG Tab TK 1 T PO  BID  2    levothyroxine (SYNTHROID) 75 MCG tablet TK 1 T PO AM  6    LYRICA 100 mg capsule TK 1 C PO TID  5    mirtazapine (REMERON) 30 MG tablet Take 30 mg by mouth every evening.      mycophenolate (CELLCEPT) 250 mg Cap Q AM  2    NITROSTAT 0.4 mg SL tablet ALICIA  12    oxycodone-acetaminophen (PERCOCET)  mg per tablet TK 1 T PO TID, PRN PAIN (patient taking 4 times daily)  0    rosuvastatin (CRESTOR) 20 MG tablet TAKE 1 TABLET DAILY 90 tablet 3    SYRINGE AND NEEDLE,INSULIN,1ML (INSULIN SYRINGE-NEEDLE U-100) 1 mL 29 gauge x 7/16" Syrg USE AS DIRECTED BID  5    TRADJENTA 5 mg Tab tablet TK 1 T PO AM  6    tizanidine (ZANAFLEX) 4 MG tablet Take 4 mg by mouth nightly.       No current facility-administered medications for this visit.        REVIEW OF SYSTEMS:    GENERAL:  No weight loss, malaise or fevers.+ DM   HEENT:  Negative for frequent or significant headaches.   NECK:+ neck pain   RESPIRATORY: + COPD,  Negative for cough, wheezing or shortness of breath.  CARDIOVASCULAR:  Negative for chest pain, leg swelling or palpitations.  GI:  Negative for abdominal discomfort, blood in stools or black stools or change in bowel habits.  MUSCULOSKELETAL:  See HPI.  SKIN:  Negative for lesions, rash, and itching.  PSYCH:  Negative for sleep disturbance, + depression + anxiety   HEMATOLOGY/LYMPHOLOGY:  Negative for prolonged bleeding, bruising easily or swollen nodes.  NEURO: + temporal arteritis  No history of  syncope, paralysis, seizures  Hx of CVA, + seizures on Keppra   All other reviewed and negative other than HPI.    Past Medical History:  Past Medical History:   Diagnosis Date    Acute renal failure (ARF)     PAST H/O    Anxiety and depression     Arthritis     RA    Bronchitis     " COPD (chronic obstructive pulmonary disease)     Diabetes mellitus     IDDM    Diabetic neuropathy     knees down    Diverticulosis     Diverticulosis of colon 2006    Encounter for blood transfusion     Fibromyalgia     History of stroke -- cerebellar CVA 05/2014    Believed secondary to temporal arteritis    Klippel-Feil syndrome     Left anterior fascicular block     Peroneal tenosynovitis 10/2015    Pneumonia     Seizures     start 2012-multiple seizures, none since 2013    Shingles 2005    Stroke     vision changes-found on MRA-temoral arteritis    Suicide attempt 08/15/2016    Use of BDZ, TCA, barbiturates    Tear of talofibular ligament of right lower extremity 10/2015    Temporal arteritis     Temporal arteritis syndrome     Thyroid disease        Past Surgical History:  Past Surgical History:   Procedure Laterality Date    BACK SURGERY  1998    hardware and cages (1998 and 2000)    CARPAL TUNNEL RELEASE Bilateral     CHOLECYSTECTOMY  1985    COLONOSCOPY  2013    ESOPHAGOGASTRODUODENOSCOPY  09/17/2015    Biopsies performed    HYSTERECTOMY  1979    LUMBAR SPINE SURGERY  1998    Diskectomy at L3-4 and L4-5 with laminectomies (1998 and 2000)    TONSILLECTOMY         Family History:  Family History   Problem Relation Age of Onset    Diabetes Mother     Heart disease Mother     Heart disease Father        Social History:  Social History     Social History    Marital status:      Spouse name: N/A    Number of children: N/A    Years of education: N/A     Social History Main Topics    Smoking status: Current Every Day Smoker     Packs/day: 0.25     Years: 40.00     Types: Cigarettes     Start date: 9/13/1974    Smokeless tobacco: Never Used      Comment: quit-3 years and started back-pamphlet put on chart  to give to pt.     Alcohol use No    Drug use: No    Sexual activity: Not Currently     Partners: Male     Birth control/ protection: Post-menopausal      Comment:       Other Topics Concern    None     Social History Narrative    None       OBJECTIVE:    /64   Pulse 70   Wt 63.6 kg (140 lb 3.4 oz)   BMI 32.59 kg/m²     PHYSICAL EXAMINATION:    General appearance: Well appearing, in no acute distress, alert and oriented x3.  Psych:  Mood and affect appropriate.  Skin:scar of previous L spine surgery, + rash in the buttock/coccygela area (burn due to heating pad)  Skin color, texture, turgor normal, no rashes or lesions, in both upper and lower body.  Head/face:  Normocephalic, atraumatic. No palpable lymph nodes.  Neck: No pain to palpation over the cervical paraspinous muscles. Spurling Negative. No pain with neck flexion, extension, or lateral flexion.   Cor: RRR  Pulm: CTA  Back:  Straight leg raising in the sitting and supine positions is negative to radicular pain. +  pain to palpation over the L spine   Extremities: Peripheral joint ROM is full and pain free without obvious instability or laxity in all four extremities. No deformities, edema, or skin discoloration. Good capillary refill.  Musculoskeletal: Shoulder, hip, sacroiliac and knee provocative maneuvers are negative. Bilateral upper and lower extremity strength is normal and symmetric.  No atrophy or tone abnormalities are noted.  Neuro: Bilateral upper and lower extremity coordination and muscle stretch reflexes are physiologic and symmetric.  Plantar response are downgoing. No loss of sensation is noted.  Gait: slow    ASSESSMENT: 67 y.o. year old female with coccygeal pain and right leg radicular   pain, consistent with       Diagnosis:    1. Chronic pain syndrome     2. Lumbar post-laminectomy syndrome     3. Lumbar radiculopathy     4. Coccydynia           PLAN:     - I have stressed the importance of physical activity and a home exercise plan to help with pain and improve health.  - Patient can continue with medications for now since they are providing benefits, using them appropriately,  and without side effects.  - Counseled patient regarding the importance of activity modification, constant sleeping habits and physical therapy.  - Previous imaging was reviewed and discussed with the patient today.   - Schedule for a Caudal OSWALDO with Cath to help with pain and progress with a Home exercise program.  - I have explained the risks, benefits, and alternatives of the procedure in detail. The patient voices understanding and all questions have been answered. The patient agrees to proceed as planned. Written Consent obtained.   -RTC 2-3 weeks following procedure.  -The above plan and management options were discussed at length with patient. Patient is in agreement with the above and verbalized understanding. Dr. Hanson  was consulted on this patient  and agrees with this plan.       Leo Arredondo NP-C  Interventional Pain Management      07/20/2018

## 2018-07-20 ENCOUNTER — OFFICE VISIT (OUTPATIENT)
Dept: PAIN MEDICINE | Facility: CLINIC | Age: 68
End: 2018-07-20
Payer: COMMERCIAL

## 2018-07-20 VITALS
WEIGHT: 140.19 LBS | HEART RATE: 70 BPM | DIASTOLIC BLOOD PRESSURE: 64 MMHG | SYSTOLIC BLOOD PRESSURE: 118 MMHG | BODY MASS INDEX: 32.59 KG/M2

## 2018-07-20 DIAGNOSIS — G89.4 CHRONIC PAIN SYNDROME: Primary | ICD-10-CM

## 2018-07-20 DIAGNOSIS — M54.16 LUMBAR RADICULOPATHY: ICD-10-CM

## 2018-07-20 DIAGNOSIS — M96.1 LUMBAR POST-LAMINECTOMY SYNDROME: ICD-10-CM

## 2018-07-20 DIAGNOSIS — M53.3 COCCYDYNIA: ICD-10-CM

## 2018-07-20 PROCEDURE — 99214 OFFICE O/P EST MOD 30 MIN: CPT | Mod: S$GLB,,, | Performed by: NURSE PRACTITIONER

## 2018-07-20 PROCEDURE — 99999 PR PBB SHADOW E&M-EST. PATIENT-LVL IV: CPT | Mod: PBBFAC,,, | Performed by: NURSE PRACTITIONER

## 2018-08-03 ENCOUNTER — SURGERY (OUTPATIENT)
Age: 68
End: 2018-08-03

## 2018-08-03 ENCOUNTER — HOSPITAL ENCOUNTER (OUTPATIENT)
Facility: HOSPITAL | Age: 68
Discharge: HOME | End: 2018-08-03
Attending: PAIN MEDICINE | Admitting: PAIN MEDICINE
Payer: COMMERCIAL

## 2018-08-03 ENCOUNTER — HOSPITAL ENCOUNTER (OUTPATIENT)
Dept: RADIOLOGY | Facility: HOSPITAL | Age: 68
Discharge: HOME | End: 2018-08-03
Attending: NURSE PRACTITIONER | Admitting: PAIN MEDICINE
Payer: COMMERCIAL

## 2018-08-03 DIAGNOSIS — M54.16 LUMBAR RADICULOPATHY: ICD-10-CM

## 2018-08-03 DIAGNOSIS — M54.16 LUMBAR RADICULOPATHY: Primary | ICD-10-CM

## 2018-08-03 PROCEDURE — 63600175 PHARM REV CODE 636 W HCPCS: Performed by: PAIN MEDICINE

## 2018-08-03 PROCEDURE — 25000003 PHARM REV CODE 250: Performed by: PAIN MEDICINE

## 2018-08-03 PROCEDURE — 62323 NJX INTERLAMINAR LMBR/SAC: CPT | Performed by: PAIN MEDICINE

## 2018-08-03 PROCEDURE — 25500020 PHARM REV CODE 255: Performed by: PAIN MEDICINE

## 2018-08-03 PROCEDURE — 62327 NJX INTERLAMINAR LMBR/SAC: CPT | Mod: ,,, | Performed by: PAIN MEDICINE

## 2018-08-03 PROCEDURE — 99152 MOD SED SAME PHYS/QHP 5/>YRS: CPT | Mod: ,,, | Performed by: PAIN MEDICINE

## 2018-08-03 RX ORDER — DEXAMETHASONE SODIUM PHOSPHATE 10 MG/ML
INJECTION INTRAMUSCULAR; INTRAVENOUS
Status: DISCONTINUED | OUTPATIENT
Start: 2018-08-03 | End: 2018-08-03 | Stop reason: HOSPADM

## 2018-08-03 RX ORDER — LIDOCAINE HYDROCHLORIDE 10 MG/ML
INJECTION INFILTRATION; PERINEURAL
Status: DISCONTINUED | OUTPATIENT
Start: 2018-08-03 | End: 2018-08-03 | Stop reason: HOSPADM

## 2018-08-03 RX ORDER — FENTANYL CITRATE 50 UG/ML
INJECTION, SOLUTION INTRAMUSCULAR; INTRAVENOUS
Status: DISCONTINUED | OUTPATIENT
Start: 2018-08-03 | End: 2018-08-03 | Stop reason: HOSPADM

## 2018-08-03 RX ORDER — SODIUM CHLORIDE 9 MG/ML
INJECTION, SOLUTION INTRAVENOUS CONTINUOUS
Status: DISCONTINUED | OUTPATIENT
Start: 2018-08-03 | End: 2018-08-03 | Stop reason: HOSPADM

## 2018-08-03 RX ORDER — LIDOCAINE HYDROCHLORIDE 10 MG/ML
INJECTION, SOLUTION EPIDURAL; INFILTRATION; INTRACAUDAL; PERINEURAL
Status: DISCONTINUED | OUTPATIENT
Start: 2018-08-03 | End: 2018-08-03 | Stop reason: HOSPADM

## 2018-08-03 RX ORDER — MIDAZOLAM HYDROCHLORIDE 1 MG/ML
INJECTION, SOLUTION INTRAMUSCULAR; INTRAVENOUS
Status: DISCONTINUED | OUTPATIENT
Start: 2018-08-03 | End: 2018-08-03 | Stop reason: HOSPADM

## 2018-08-03 RX ADMIN — MIDAZOLAM 2 MG: 1 INJECTION INTRAMUSCULAR; INTRAVENOUS at 08:08

## 2018-08-03 RX ADMIN — IOHEXOL 5 ML: 300 INJECTION, SOLUTION INTRAVENOUS at 08:08

## 2018-08-03 RX ADMIN — FENTANYL CITRATE 100 MCG: 50 INJECTION, SOLUTION INTRAMUSCULAR; INTRAVENOUS at 08:08

## 2018-08-03 RX ADMIN — LIDOCAINE HYDROCHLORIDE 5 ML: 10 INJECTION, SOLUTION EPIDURAL; INFILTRATION; INTRACAUDAL; PERINEURAL at 08:08

## 2018-08-03 RX ADMIN — DEXAMETHASONE SODIUM PHOSPHATE 10 MG: 10 INJECTION, SOLUTION INTRAMUSCULAR; INTRAVENOUS at 08:08

## 2018-08-03 RX ADMIN — LIDOCAINE HYDROCHLORIDE 5 ML: 10 INJECTION, SOLUTION INFILTRATION; PERINEURAL at 08:08

## 2018-08-03 RX ADMIN — SODIUM CHLORIDE 500 ML: 0.9 INJECTION, SOLUTION INTRAVENOUS at 08:08

## 2018-08-03 NOTE — DISCHARGE SUMMARY
Discharge Diagnosis:Lumbar radiculopathy [M54.16]  Condition on Discharge: Stable.  Diet on Discharge: Same as before.  Activity: as per instruction sheet.  Discharge to: Home with a responsible adult.  Follow up: as per Discharge instructions

## 2018-08-03 NOTE — OP NOTE
Caudal Epidural Steroid Injection under Fluoroscopy  Current medications reviewed. Time-out taken to identify patient and procedure prior to starting the procedure.      Date of Service: 08/03/2018    PCP: Dominic Orellana MD    Referring Physician:    I attest that I have reviewed the patient's home medications prior to the procedure and no contraindication have been identified. I  re-evaluated the patient after the patient was positioned for the procedure in the procedure room immediately before the procedural time-out. The vital signs are current and represent the current state of the patient which has not significantly changed since the preprocedure assessment.                                                   PROCEDURE:  Caudal epidural steroid injection under fluoroscopy with insertion of flexible catheter    REASON FOR PROCEDURE:  Lumbar radiculopathy [M54.16]    PHYSICIAN: Boris Hanson MD  ASSISTANTS: None    MEDICATIONS INJECTED:  Preservative-free dexamethasone 10mg, sterile preservative free normal saline 2-4ml and preservative free sterile Bupivicaine 0.25% 5ml.    LOCAL ANESTHETIC GIVEN:  Xylocaine 1% 9ml with Sodium Bicarbonate 1ml.   SEDATION MEDICATIONS: Versed 2 mg IV and Fentanyl 100 mcg IV      ESTIMATED BLOOD LOSS:  None.    COMPLICATIONS:  None.    TECHNIQUE:   Laying in the prone position, the patient was prepped and draped in the usual sterile fashion using ChloraPrep and fenestrated drape.  Appropriate anatomic landmarks were determined including the superior LS-spine and sacral hiatus.  Local anesthetic was given by raising a wheel and going down to the periosteum.  A 3.5in 16-gauge spinal needle was introduced thru the sacral hiatus.  Omnipaque was injected to confirm placement in the appropriate area and that there was no vascular runoff.  The flexible catheter threaded through to the desired levels. Omnipaque was reinjected to confirm placement in the appropriate area. The  medication was then injected slowly.  The patient tolerated the procedure well.    PAIN BEFORE THE PROCEDURE:  7/10.    PAIN AFTER THE PROCEDURE: 0/10.    The patient was monitored after the procedure.  Patient was given post procedure and discharge instructions to follow at home.  We will see the patient back in two weeks or the patient may call to inform of status. The patient was discharged in a stable condition.

## 2018-08-03 NOTE — INTERVAL H&P NOTE
The patient has been examined and the H&P has been reviewed:        I concur with the findings and no changes have occurred since H&P was written.    AAOx3 NAD  Mood and affect normal  Memory and language intact  RRR  CTAB      Patient cleared for Anesthesia: Conscious Sedation        Anesthesia/Surgery risks, benefits and alternative options discussed and understood by patient/family.      There are no hospital problems to display for this patient.

## 2018-08-08 VITALS
RESPIRATION RATE: 18 BRPM | SYSTOLIC BLOOD PRESSURE: 124 MMHG | HEART RATE: 60 BPM | DIASTOLIC BLOOD PRESSURE: 62 MMHG | TEMPERATURE: 97 F | OXYGEN SATURATION: 93 %

## 2020-08-04 PROBLEM — R13.10 DYSPHAGIA: Status: ACTIVE | Noted: 2020-08-04

## 2022-11-16 PROBLEM — R07.9 CHEST PAIN: Status: ACTIVE | Noted: 2022-11-16

## 2024-09-05 ENCOUNTER — TELEPHONE (OUTPATIENT)
Dept: PSYCHIATRY | Facility: CLINIC | Age: 74
End: 2024-09-05

## 2024-09-05 NOTE — TELEPHONE ENCOUNTER
Spoke to Dr Lo, Psychiatrist, he would like to refer pt for ECT for treatment resistant depression. Pt not responding to medication. Pt scheduled. See note.         ----- Message from Perry Lancaster MA sent at 9/5/2024  3:49 PM CDT -----  Hi, Dr. Corona Lo called today concerned about his patient mental magnolia and would like to speak with you at your earliest convenience to schedule her for ECT   Dr. Lo contact # [phone]

## 2024-09-12 ENCOUNTER — TELEPHONE (OUTPATIENT)
Dept: PSYCHIATRY | Facility: CLINIC | Age: 74
End: 2024-09-12
Payer: MEDICARE

## 2024-11-02 PROBLEM — J20.6 ACUTE BRONCHITIS DUE TO RHINOVIRUS: Status: ACTIVE | Noted: 2024-11-02

## 2024-11-02 PROBLEM — E11.65 DIABETES MELLITUS WITH HYPERGLYCEMIA: Status: ACTIVE | Noted: 2024-11-02

## 2024-11-02 PROBLEM — J20.5 ACUTE BRONCHITIS DUE TO RESPIRATORY SYNCYTIAL VIRUS (RSV): Status: ACTIVE | Noted: 2024-11-02

## 2024-11-04 PROBLEM — N17.9 ACUTE KIDNEY FAILURE: Status: ACTIVE | Noted: 2024-11-04

## 2025-05-28 PROBLEM — R57.9 SHOCK: Status: ACTIVE | Noted: 2025-05-28

## 2025-05-28 PROBLEM — R00.1 BRADYCARDIA: Status: ACTIVE | Noted: 2025-05-28

## 2025-05-29 PROBLEM — R53.1 WEAKNESS: Status: ACTIVE | Noted: 2025-05-29

## 2025-06-19 ENCOUNTER — TELEPHONE (OUTPATIENT)
Dept: NEUROSURGERY | Facility: CLINIC | Age: 75
End: 2025-06-19
Payer: MEDICARE

## 2025-07-09 DIAGNOSIS — M54.16 LUMBAR RADICULOPATHY: Primary | ICD-10-CM

## 2025-07-10 ENCOUNTER — TELEPHONE (OUTPATIENT)
Dept: NEUROSURGERY | Facility: CLINIC | Age: 75
End: 2025-07-10
Payer: MEDICARE

## 2025-07-10 PROBLEM — I95.9 HYPOTENSION: Status: ACTIVE | Noted: 2025-07-10

## 2025-07-10 PROBLEM — A41.9 SEPTIC SHOCK: Status: ACTIVE | Noted: 2025-05-28

## 2025-07-10 PROBLEM — R65.21 SEPTIC SHOCK: Status: ACTIVE | Noted: 2025-05-28

## 2025-07-10 PROBLEM — N18.32 ACUTE RENAL FAILURE SUPERIMPOSED ON STAGE 3B CHRONIC KIDNEY DISEASE: Status: ACTIVE | Noted: 2024-11-04

## 2025-07-10 NOTE — TELEPHONE ENCOUNTER
Attempted to return call to pt's son. No answer. Could not lvm as mail box was full.    Copied from CRM #[id number]. Topic: Appointments - Appointment Access  >> Jul 10, 2025  8:16 AM Kerline wrote:  Patient's son called to let the provider know patient is currently admitted in the hospital so he had to cancel her Xray and office appointment. Please call back as soon as possible.

## 2025-07-14 PROBLEM — J96.02 ACUTE RESPIRATORY FAILURE WITH HYPOXIA AND HYPERCARBIA: Status: ACTIVE | Noted: 2025-07-14

## 2025-07-14 PROBLEM — J81.1 PULMONARY EDEMA: Status: ACTIVE | Noted: 2025-07-14

## 2025-07-14 PROBLEM — J96.01 ACUTE RESPIRATORY FAILURE WITH HYPOXIA AND HYPERCARBIA: Status: ACTIVE | Noted: 2025-07-14

## 2025-07-16 PROBLEM — I50.21 ACUTE SYSTOLIC (CONGESTIVE) HEART FAILURE: Status: ACTIVE | Noted: 2025-07-16

## 2025-07-16 PROBLEM — I42.9 CARDIOMYOPATHY: Status: ACTIVE | Noted: 2025-07-16

## 2025-07-23 ENCOUNTER — TELEPHONE (OUTPATIENT)
Dept: NEUROSURGERY | Facility: CLINIC | Age: 75
End: 2025-07-23
Payer: MEDICARE

## (undated) DEVICE — APPLICATOR CHLORAPREP ORN 26ML

## (undated) DEVICE — TRAY NERVE BLOCK

## (undated) DEVICE — CATH CAUDAL EPIDURAL 19G

## (undated) DEVICE — SYR 10CC LUER LOCK

## (undated) DEVICE — CATH IV INTROCAN 22G X 1

## (undated) DEVICE — SET IV 20 DRIP VENT/NON-VENT

## (undated) DEVICE — SKIN MARKER DEVON 160

## (undated) DEVICE — NDL RK EPIDURAL 16GATWX3 1/2IN

## (undated) DEVICE — GLOVE BIOGEL 7.5